# Patient Record
Sex: FEMALE | Race: WHITE | Employment: FULL TIME | URBAN - METROPOLITAN AREA
[De-identification: names, ages, dates, MRNs, and addresses within clinical notes are randomized per-mention and may not be internally consistent; named-entity substitution may affect disease eponyms.]

---

## 2022-01-02 ENCOUNTER — HOSPITAL ENCOUNTER (EMERGENCY)
Age: 32
Discharge: HOME | End: 2022-01-02
Attending: EMERGENCY MEDICINE
Payer: MEDICAID

## 2022-01-02 VITALS
HEART RATE: 71 BPM | OXYGEN SATURATION: 99 % | BODY MASS INDEX: 23.22 KG/M2 | DIASTOLIC BLOOD PRESSURE: 73 MMHG | TEMPERATURE: 98.8 F | SYSTOLIC BLOOD PRESSURE: 118 MMHG | HEIGHT: 67 IN | WEIGHT: 147.93 LBS | RESPIRATION RATE: 16 BRPM

## 2022-01-02 DIAGNOSIS — L03.211 FACIAL CELLULITIS: Primary | ICD-10-CM

## 2022-01-02 PROCEDURE — 6370000000 HC RX 637 (ALT 250 FOR IP): Performed by: EMERGENCY MEDICINE

## 2022-01-02 PROCEDURE — 99284 EMERGENCY DEPT VISIT MOD MDM: CPT

## 2022-01-02 RX ORDER — SULFAMETHOXAZOLE AND TRIMETHOPRIM 800; 160 MG/1; MG/1
1 TABLET ORAL 2 TIMES DAILY
Qty: 20 TABLET | Refills: 0 | Status: SHIPPED | OUTPATIENT
Start: 2022-01-02 | End: 2022-01-12

## 2022-01-02 RX ORDER — SULFAMETHOXAZOLE AND TRIMETHOPRIM 800; 160 MG/1; MG/1
1 TABLET ORAL ONCE
Status: COMPLETED | OUTPATIENT
Start: 2022-01-02 | End: 2022-01-02

## 2022-01-02 RX ADMIN — SULFAMETHOXAZOLE AND TRIMETHOPRIM 1 TABLET: 800; 160 TABLET ORAL at 13:38

## 2022-01-02 ASSESSMENT — VISUAL ACUITY
OD: 20/25
OS: 20/20
OU: 20/20

## 2022-01-02 ASSESSMENT — PAIN SCALES - GENERAL
PAINLEVEL_OUTOF10: 0
PAINLEVEL_OUTOF10: 0

## 2022-01-02 NOTE — ED PROVIDER NOTES
[address]      Pt Name: Yany Avery  MRN: [id number]  [place] 1990  Date of evaluation: 2022  Provider: Corina Rollins, [address] [place] [address]  Chief Complaint   Patient presents with    Eye Problem     pt wore sparkley eye shadow on  and swelling of right eye when she woke up yesterday, increase in swelling today       I wore personal protective equipment when I was in the room the entire time. This includes gloves, N95 mask, face shield, and a glove over my stethoscope for protection. HPI  Yany Avery is a 27 y.o. female who presents with pain and swelling in the right infraorbital area. Is been present for 24 hours. She denies any discharge. She denies any family members being sick or with coworkers being sick. She denies any foreign body feeling or pain in her eye. She is complaining of pain of the lower eyelid. She denies any fever chills. She denies any injuries. Nothing makes it better or worse. She describes it as moderate. REVIEW OF SYSTEMS  All systems negative except as noted in the HPI. Reviewed Nurses' notes and concur. Patient's last menstrual period was 2021. PAST MEDICAL HISTORY  Past Medical History:   Diagnosis Date    Seasonal allergies        FAMILY HISTORY  History reviewed. No pertinent family history. SOCIAL HISTORY   reports that she has never smoked. She has never used smokeless tobacco. She reports current alcohol use. She reports that she does not use drugs. SURGICAL HISTORY  Past Surgical History:   Procedure Laterality Date     SECTION         CURRENT MEDICATIONS      ALLERGIES  Allergies   Allergen Reactions    Penicillins Rash       Tetanus vaccination status reviewed: last tetanus booster within 10 years.     PHYSICAL EXAM  VITAL SIGNS: /73   Pulse 71   Temp 98.8 °F (37.1 °C) (Oral)   Resp 16   Ht 5' 7\" (1.702 m)   Wt 147 lb 14.9 oz (67.1 kg)   [facility] 12/26/2021   SpO2 99%   BMI 23.17 kg/m²   Constitutional: Well-developed, well-nourished, appears normal, nontoxic, activity: Resting comfortably on the cart, no obvious pain, speaking in full sentences. HENT: Normocephalic, Atraumatic, Bilateral external ears normal, Oropharynx pain and moist, no pharyngeal exudates, no pharyngeal erythema, Nose: no mucosal edema, no nasal erythema, no nasal discharge. Eyes: PERRLA, Conjunctiva normal, No discharge. There is mild erythema noted of the lower eyelid of the right eye. There is mild tenderness. She has full EOMI without pain, there is no subconjunctival hematoma. There is no hyphema or hypopyon. Conjunctiva are clear, there is no evidence of conjunctivitis. There is no evidence of foreign body. Neck: Normal range of motion, mild anterior cervical tenderness, Supple, mild anterior cervical adenopathy. Skin: Warm, Dry, No erythema, no rash. Neurologic: Alert & oriented x 3    COURSE & MEDICAL DECISION MAKING  I do not feel laboratory work or imaging is necessary at this time. Vitals:    01/02/22 1302   BP: 118/73   Pulse: 71   Resp: 16   Temp: 98.8 °F (37.1 °C)   TempSrc: Oral   SpO2: 99%   Weight: 147 lb 14.9 oz (67.1 kg)   Height: 5' 7\" (1.702 m)       Medications   sulfamethoxazole-trimethoprim (BACTRIM DS;SEPTRA DS) 800-160 MG per tablet 1 tablet (has no administration in time range)       New Prescriptions    No medications on file       SEP-1 CORE MEASURE DATA  Exclusion criteria: the patient is NOT to be included for sepsis due to:  SIRS criteria are not met    Patient remained stable in the ED. there is mild erythema of the lower eyelid of the right eye and she has mild anterior cervical adenitis. Therefore, I feel this is probably a cellulitis of her right lower eyelid. Therefore, patient was placed on antibiotics and given first dose in emergency department.   She was instructed follow with her doctor in 3 to 5 days and return if any problems. The patient's blood pressure was not found to be elevated according to CMS/Medicare and the Affordable Care Act/ObamaCare criteria. .    See discharge instructions for specific medications, discharge information, and treatments. They were verbally instructed to return to emergency if any problems. (This chart has been completed using [address]. Although attempts have been made to ensure accuracy, words and/or phrases may not be transcribed as intended.)    Patient refused pain medicines at the time of their exam.    IMPRESSION(S):  1.  Facial cellulitis        Diagnostic considerations include but are not limited to:  Necrotizing fasciitis, cellulitis, erysipelas, suppurative thrombophlebitis, aseptic superficial thrombophlebitis, DVT, gout, compartment syndrome, erythema migrans (lyme disease), contact dermatitis, lymphedema, other         Markie Shoemaker DO  01/02/22 8217